# Patient Record
Sex: FEMALE | Race: WHITE | NOT HISPANIC OR LATINO | Employment: FULL TIME | URBAN - METROPOLITAN AREA
[De-identification: names, ages, dates, MRNs, and addresses within clinical notes are randomized per-mention and may not be internally consistent; named-entity substitution may affect disease eponyms.]

---

## 2018-06-09 ENCOUNTER — HOSPITAL ENCOUNTER (EMERGENCY)
Facility: MEDICAL CENTER | Age: 58
End: 2018-06-09
Attending: EMERGENCY MEDICINE
Payer: COMMERCIAL

## 2018-06-09 VITALS
WEIGHT: 186.51 LBS | HEART RATE: 105 BPM | RESPIRATION RATE: 20 BRPM | TEMPERATURE: 97.8 F | SYSTOLIC BLOOD PRESSURE: 117 MMHG | OXYGEN SATURATION: 95 % | DIASTOLIC BLOOD PRESSURE: 80 MMHG | BODY MASS INDEX: 31.84 KG/M2 | HEIGHT: 64 IN

## 2018-06-09 DIAGNOSIS — T85.848A DENTAL IMPLANT PAIN, INITIAL ENCOUNTER: ICD-10-CM

## 2018-06-09 PROCEDURE — 700102 HCHG RX REV CODE 250 W/ 637 OVERRIDE(OP): Performed by: EMERGENCY MEDICINE

## 2018-06-09 PROCEDURE — 99283 EMERGENCY DEPT VISIT LOW MDM: CPT

## 2018-06-09 PROCEDURE — A9270 NON-COVERED ITEM OR SERVICE: HCPCS | Performed by: EMERGENCY MEDICINE

## 2018-06-09 RX ORDER — PENICILLIN V POTASSIUM 500 MG/1
500 TABLET ORAL
Qty: 40 TAB | Refills: 0 | Status: SHIPPED | OUTPATIENT
Start: 2018-06-09 | End: 2018-06-19

## 2018-06-09 RX ORDER — PENICILLIN V POTASSIUM 500 MG/1
500 TABLET ORAL ONCE
Status: COMPLETED | OUTPATIENT
Start: 2018-06-09 | End: 2018-06-09

## 2018-06-09 RX ORDER — IBUPROFEN 600 MG/1
600 TABLET ORAL ONCE
Status: DISCONTINUED | OUTPATIENT
Start: 2018-06-09 | End: 2018-06-09 | Stop reason: HOSPADM

## 2018-06-09 RX ORDER — NAPROXEN 500 MG/1
500 TABLET ORAL ONCE
Status: DISCONTINUED | OUTPATIENT
Start: 2018-06-09 | End: 2018-06-09 | Stop reason: HOSPADM

## 2018-06-09 RX ORDER — IBUPROFEN 600 MG/1
600 TABLET ORAL EVERY 6 HOURS PRN
Qty: 20 TAB | Refills: 0 | Status: SHIPPED | OUTPATIENT
Start: 2018-06-09

## 2018-06-09 RX ADMIN — PENICILLIN V POTASIUM 500 MG: 500 TABLET OROPHARYNGEAL at 10:56

## 2018-06-09 ASSESSMENT — PAIN SCALES - GENERAL: PAINLEVEL_OUTOF10: 9

## 2018-06-09 NOTE — ED PROVIDER NOTES
"ED Provider Note    Scribed for Loco Hector M.D. by Francois Blackmon. 6/9/2018, 10:38 AM.    Primary care provider: None noted  Means of arrival: Walk in  History obtained from: Patient  History limited by: None    CHIEF COMPLAINT  Chief Complaint   Patient presents with   • Dental Pain       HPI  Marla Mckeon is a 58 y.o. female who presents to the Emergency Department complaining of left upper dental pain onset last night. Patient notes having a dental implant. She states the area is infected. She was having a bad taste in her mouth last night, prompting her to gargle with hydrogen peroxide. The pain started after doing this rinse. Patient otherwise does not report any other associated symptoms at this time. She does not report any recent fevers.       REVIEW OF SYSTEMS  Pertinent positives include left upper dental pain.   Pertinent negatives include no recent fevers.    E     PAST MEDICAL HISTORY   None noted    SURGICAL HISTORY  patient denies any surgical history    SOCIAL HISTORY  None noted    FAMILY HISTORY  No pertinent family history reported.     CURRENT MEDICATIONS  Reviewed.  See Encounter Summary.     ALLERGIES  No Known Allergies    PHYSICAL EXAM  VITAL SIGNS: /80   Pulse (!) 105   Temp 36.6 °C (97.8 °F)   Resp 20   Ht 1.626 m (5' 4\")   Wt 84.6 kg (186 lb 8.2 oz)   SpO2 95%   BMI 32.01 kg/m²   Nursing note and vitals reviewed.  Constitutional: No distress.   HENT: Mucus membranes moist.  Oropharynx is clear.  There are scattered caries.  There is no facial edema.  There is tenderness over tooth #10, no apparent swelling.  Dental implant protruding from gum in the location of tooth #10. Tongue is normal.  Floor of the mouth is normal.  Submental space is soft.  Posterior pharynx is normal.  Patient is tolerating secretions without difficulty. There is no evidence of Celso's Angina.  Eyes: Conjunctivae are normal. Pupils are equal, round, and reactive to light.   Cardiovascular: " Normal peripheral perfusion  Respiratory: No respiratory distress.   Musculoskeletal: Normal range of motion.   Neurological: Alert. No focal deficits noted.    Skin: No rash.   Psych: Appropriate for clinical situation       COURSE & MEDICAL DECISION MAKING  Nursing notes, VS, PMSFHx reviewed in chart.    Reviewed the patient's prescription history on [place] Prescription Monitoring Program. Patient's last prescription was 72 norco on May 11, 2018. She had 120 norco on April 30, 2018 and 120 norco on March 20, 2018.      Narcotics: 410 (Value from NarxCheck System.)    Sedatives: 150 (Value from NarxCheck System.)    Stimulants: 80 (Value from NarxCheck System.)     NARxSCORES can range from 000 to 999. This first two digits represent the composite percentile risk based on an overall analysis of prescription drug use. The third digit represents the number of active prescriptions. The distribution of scores in the population is such that approximately 75% fall below 200, 95% fall below 500 and 99% fall below 650.     SELECT THE LINK BELOW TO REVIEW THE NARxCheck REPORT  or  SELECT THE 'ACCEPT' BUTTON TO CONTINUTE AFTER REVIEWING THE SCORES             10:38 AM - Patient seen and examined at bedside. Patient presents with left upper dental pain. Patient will be treated with veetid 500 mg, motrin 600 mg. The patient will then be discharged with instructions regarding supportive care and medications including Veetid, naprosyn, and motrin. Instructions were given for follow-up with oral surgery. Discussed indications for seeking immediate medical attention. Patient was given the opportunity for questions. The patient understands and agrees.      DISPOSITION:  Patient will be discharged home in stable condition.    FOLLOW UP:  [facility], Emergency Dept  [address] [address] [phone]    If symptoms worsen    Low Storey DMD, M.D.  [address]  [place] [facility] [facility]  [address]  [phone]    Schedule an appointment as soon as possible for a visit  oral surgeon      OUTPATIENT MEDICATIONS:  New Prescriptions    IBUPROFEN (MOTRIN) 600 MG TAB    Take 1 Tab by mouth every 6 hours as needed.    PENICILLIN V POTASSIUM (VEETID) 500 MG TAB    Take 1 Tab by mouth 4 Times a Day,Before Meals and at Bedtime for 10 days.       The patient was discharged home with an information sheet on dental pain and told to return immediately for any signs or symptoms listed.  The patient agreed to the discharge precautions and follow-up plan which is documented in EPIC.    FINAL IMPRESSION  1. Dental implant pain, initial encounter          Francois KENT ([place]), am scribing for, and in the presence of, Loco Hector M.D..    Electronically signed by: Francois Blackmon ([place]), 6/9/2018    Loco KENT M.D. personally performed the services described in this documentation, as scribed by Francois Blackmon in my presence, and it is both accurate and complete.    The note accurately reflects work and decisions made by me.  Loco Hector  6/9/2018  11:35 AM

## 2018-06-09 NOTE — DISCHARGE INSTRUCTIONS
Dental Pain  Dental pain may be caused by many things, including:  · Tooth decay (cavities or caries). Cavities expose the nerve of your tooth to air and hot or cold temperatures. This can cause pain or discomfort.  · Abscess or infection. A dental abscess is a collection of infected pus from a bacterial infection in the inner part of the tooth (pulp). It usually occurs at the end of the tooth’s root.  · Injury.  · An unknown reason (idiopathic).  Your pain may be mild or severe. It may only occur when:  · You are chewing.  · You are exposed to hot or cold temperature.  · You are eating or drinking sugary foods or beverages, such as soda or candy.  Your pain may also be constant.  Follow these instructions at home:  Watch your dental pain for any changes. The following actions may help to lessen any discomfort that you are feeling:  · Take medicines only as directed by your dentist.  · If you were prescribed an antibiotic medicine, finish all of it even if you start to feel better.  · Keep all follow-up visits as directed by your dentist. This is important.  · Do not apply heat to the outside of your face.  · Rinse your mouth or gargle with salt water if directed by your dentist. This helps with pain and swelling.  ¨ You can make salt water by adding ¼ tsp of salt to 1 cup of warm water.  · Apply ice to the painful area of your face:  ¨ Put ice in a plastic bag.  ¨ Place a towel between your skin and the bag.  ¨ Leave the ice on for 20 minutes, 2-3 times per day.  · Avoid foods or drinks that cause you pain, such as:  ¨ Very hot or very cold foods or drinks.  ¨ Sweet or sugary foods or drinks.  Contact a health care provider if:  · Your pain is not controlled with medicines.  · Your symptoms are worse.  · You have new symptoms.  Get help right away if:  · You are unable to open your mouth.  · You are having trouble breathing or swallowing.  · You have a fever.  · Your face, neck, or jaw is swollen.  This information  is not intended to replace advice given to you by your health care provider. Make sure you discuss any questions you have with your health care provider.  Document Released: 12/18/2006 Document Revised: 04/27/2017 Document Reviewed: 12/14/2015  [employer] Interactive Patient Education © 2017 [employer]

## 2018-09-22 ENCOUNTER — OFFICE VISIT (OUTPATIENT)
Dept: URGENT CARE | Facility: PHYSICIAN GROUP | Age: 58
End: 2018-09-22
Payer: COMMERCIAL

## 2018-09-22 ENCOUNTER — APPOINTMENT (OUTPATIENT)
Dept: RADIOLOGY | Facility: IMAGING CENTER | Age: 58
End: 2018-09-22
Attending: PHYSICIAN ASSISTANT
Payer: COMMERCIAL

## 2018-09-22 VITALS
WEIGHT: 169 LBS | HEART RATE: 100 BPM | HEIGHT: 65 IN | DIASTOLIC BLOOD PRESSURE: 62 MMHG | SYSTOLIC BLOOD PRESSURE: 98 MMHG | TEMPERATURE: 98 F | BODY MASS INDEX: 28.16 KG/M2 | OXYGEN SATURATION: 98 %

## 2018-09-22 DIAGNOSIS — S93.401A SPRAIN OF RIGHT ANKLE, UNSPECIFIED LIGAMENT, INITIAL ENCOUNTER: ICD-10-CM

## 2018-09-22 DIAGNOSIS — J01.00 ACUTE NON-RECURRENT MAXILLARY SINUSITIS: ICD-10-CM

## 2018-09-22 PROCEDURE — 99204 OFFICE O/P NEW MOD 45 MIN: CPT | Mod: 25 | Performed by: PHYSICIAN ASSISTANT

## 2018-09-22 PROCEDURE — 73610 X-RAY EXAM OF ANKLE: CPT | Mod: 26,RT | Performed by: PHYSICIAN ASSISTANT

## 2018-09-22 RX ORDER — ACETAMINOPHEN 500 MG
1000 TABLET ORAL ONCE
Status: COMPLETED | OUTPATIENT
Start: 2018-09-22 | End: 2018-09-22

## 2018-09-22 RX ORDER — IBUPROFEN 800 MG/1
800 TABLET ORAL EVERY 8 HOURS PRN
Qty: 60 TAB | Refills: 1 | Status: SHIPPED | OUTPATIENT
Start: 2018-09-22

## 2018-09-22 RX ORDER — LISINOPRIL 10 MG/1
10 TABLET ORAL DAILY
COMMUNITY

## 2018-09-22 RX ORDER — TRIAMTERENE AND HYDROCHLOROTHIAZIDE 37.5; 25 MG/1; MG/1
1 TABLET ORAL DAILY
COMMUNITY

## 2018-09-22 RX ORDER — AMOXICILLIN AND CLAVULANATE POTASSIUM 875; 125 MG/1; MG/1
1 TABLET, FILM COATED ORAL 2 TIMES DAILY
Qty: 20 TAB | Refills: 0 | Status: SHIPPED | OUTPATIENT
Start: 2018-09-22

## 2018-09-22 RX ADMIN — Medication 1000 MG: at 10:24

## 2018-09-22 ASSESSMENT — ENCOUNTER SYMPTOMS
TINGLING: 0
NUMBNESS: 0
LOSS OF SENSATION: 0
FOCAL WEAKNESS: 0
SENSORY CHANGE: 0
LOSS OF MOTION: 0
INABILITY TO BEAR WEIGHT: 1

## 2018-09-22 ASSESSMENT — PAIN SCALES - GENERAL: PAINLEVEL: 8=MODERATE-SEVERE PAIN

## 2018-09-22 NOTE — PROGRESS NOTES
Subjective:      Marla Mckeon is a 58 y.o. female who presents with Ankle Injury (Rolled R ankle, fell on uneven surface this morning )    PMH:  Reviewed with patient/family member/EPIC.   MEDS:   Current Outpatient Prescriptions:   •  lisinopril (PRINIVIL) 10 MG Tab, Take 10 mg by mouth every day., Disp: , Rfl:   •  triamterene-hctz (MAXZIDE-25/DYAZIDE) 37.5-25 MG Tab, Take 1 Tab by mouth every day., Disp: , Rfl:   •  amoxicillin-clavulanate (AUGMENTIN) 875-125 MG Tab, Take 1 Tab by mouth 2 times a day., Disp: 20 Tab, Rfl: 0  •  ibuprofen (MOTRIN) 800 MG Tab, Take 1 Tab by mouth every 8 hours as needed., Disp: 60 Tab, Rfl: 1  •  ibuprofen (MOTRIN) 600 MG Tab, Take 1 Tab by mouth every 6 hours as needed., Disp: 20 Tab, Rfl: 0    Current Facility-Administered Medications:   •  acetaminophen (TYLENOL) tablet 1,000 mg, 1,000 mg, Oral, Once, Lucero Cruz, P.A.-C.  ALLERGIES: No Known Allergies  SURGHX: History reviewed. No pertinent surgical history.  SOCHX:  reports that she has been smoking.  She has been smoking about 0.25 packs per day. She has never used smokeless tobacco. She reports that she does not drink alcohol or use drugs.  FH:  Reviewed with patient/family. Not pertinent to this complaint.          Patient presents with:  Ankle Injury: Rolled R ankle, fell on uneven surface this morning while walking in her home.  Pt co swollen painful right ankle. Denies any other injury.      PT would also like to address her possible sinus infection since she is here.  Pt has had sinus pain, fullness, and congestion x 10 days, thick purulent discharge, maxillary pressure, face pain.  OTC medications not resolving symptoms.           Ankle Injury    The incident occurred 1 to 3 hours ago. The incident occurred at home. The injury mechanism was a twisting injury. The pain is present in the right ankle. The quality of the pain is described as aching. The pain is at a severity of 8/10. The pain has been constant since  "onset. Associated symptoms include an inability to bear weight. Pertinent negatives include no loss of motion, loss of sensation, numbness or tingling. She reports no foreign bodies present. The symptoms are aggravated by movement, palpation and weight bearing. She has tried ice and NSAIDs for the symptoms. The treatment provided no relief.       Review of Systems   Musculoskeletal: Positive for joint pain.   Neurological: Negative for tingling, sensory change, focal weakness and numbness.   All other systems reviewed and are negative.         Objective:     BP (!) 98/62 (BP Location: Right arm)   Pulse 100   Temp 36.7 °C (98 °F)   Ht 1.638 m (5' 4.5\")   Wt 76.7 kg (169 lb)   SpO2 98%   BMI 28.56 kg/m²      Physical Exam   Constitutional: She is oriented to person, place, and time. She appears well-developed and well-nourished. No distress.   HENT:   Head: Normocephalic and atraumatic.   Nose: Nose normal.   Eyes: Pupils are equal, round, and reactive to light. Conjunctivae and EOM are normal.   Neck: Normal range of motion. Neck supple.   Cardiovascular: Normal rate and intact distal pulses.    Pulmonary/Chest: Effort normal.   Musculoskeletal:        Right ankle: She exhibits decreased range of motion, swelling and ecchymosis. She exhibits no deformity, no laceration and normal pulse. Tenderness. Lateral malleolus, AITFL and head of 5th metatarsal tenderness found. No posterior TFL and no proximal fibula tenderness found. Achilles tendon normal.        Feet:    Neurological: She is alert and oriented to person, place, and time.   Skin: Skin is warm and dry. Capillary refill takes less than 2 seconds.   Psychiatric: She has a normal mood and affect.   Nursing note and vitals reviewed.         Xray images viewed and interpreted by me, confirmed by radiology:    Neg for acute fracture, dislocation, + soft tissue swelling.        Assessment/Plan:     1. Sprain of right ankle, unspecified ligament, initial " encounter  DX-ANKLE 3+ VIEWS RIGHT    REFERRAL TO ORTHOPEDICS    ibuprofen (MOTRIN) 800 MG Tab    acetaminophen (TYLENOL) tablet 1,000 mg   2. Acute non-recurrent maxillary sinusitis  amoxicillin-clavulanate (AUGMENTIN) 875-125 MG Tab    REFERRAL TO ORTHOPEDICS    ibuprofen (MOTRIN) 800 MG Tab    acetaminophen (TYLENOL) tablet 1,000 mg     ACE wrap and air splint applied to right ankle, crutches fit to patient.      RICE TREATMENT FOR EXTREMITY INJURIES:  R-rest the extremity as much as possible while pain and swelling persist  I-ice the extremity 15 minutes every 2 hours for the first 24 hours, then 4-5 times daily   C-compress the extremity either with splint or ace wrap as directed  E-elevate the extremity to help with swelling    Motrin/Advil/Ibuprophen 600 mg every 6 hours as needed for pain or fever.    PT should follow up with ortho in 5-7 days for re-evaluation if symptoms have not improved.  Discussed red flags and reasons to return to UC or ED.  Pt and/or family verbalized understanding of diagnosis and follow up instructions and was offered informational handout on diagnosis.  PT discharged.

## 2018-09-22 NOTE — LETTER
September 22, 2018         Patient: Marla Mckeon   YOB: 1960   Date of Visit: 9/22/2018           To Whom it May Concern:    Marla Mckeon was seen in my clinic on 9/22/2018. She may return to work on 9/25/2018.  If you have any questions or concerns, please don't hesitate to call.        Sincerely,           Lucero Cruz P.A.-C.  Electronically Signed

## 2018-09-23 ENCOUNTER — APPOINTMENT (OUTPATIENT)
Dept: RADIOLOGY | Facility: IMAGING CENTER | Age: 58
End: 2018-09-23
Attending: PHYSICIAN ASSISTANT
Payer: COMMERCIAL

## 2018-09-23 ENCOUNTER — TELEPHONE (OUTPATIENT)
Dept: URGENT CARE | Facility: PHYSICIAN GROUP | Age: 58
End: 2018-09-23

## 2018-09-23 DIAGNOSIS — J01.00 ACUTE NON-RECURRENT MAXILLARY SINUSITIS: ICD-10-CM

## 2018-09-23 DIAGNOSIS — S93.401A SPRAIN OF RIGHT ANKLE, UNSPECIFIED LIGAMENT, INITIAL ENCOUNTER: ICD-10-CM

## 2018-09-23 PROCEDURE — 73590 X-RAY EXAM OF LOWER LEG: CPT | Mod: TC,RT | Performed by: PHYSICIAN ASSISTANT

## 2018-09-23 NOTE — TELEPHONE ENCOUNTER
Returning patient's call regarding right leg pain.  I saw patient yesterday after a fall for right ankle pain which x-ray was negative for fracture, patient was placed in an Ace wrap and Aircast and crutches. Patient states the pain is now significantly worse at the top of her fibula it wasn't bothering her yesterday. Patient is concerned that either the Aircast is present and pressure on the bone or no that her ankle is supported and stable she's feeling pain in a play she didn't feel yesterday patient is quite concerned about this. I will place an x-ray for a tib-fib and she can have it done today when she gets a ride. Patient was appreciative of this idea and plan. Patient was instructed to stay in the clinic when she gets her x-ray done until the results are back.

## 2018-09-23 NOTE — PROGRESS NOTES
PT aware that xray is negative.      Impression       1.  Lateral soft tissue swelling at the ankle.  2.  No fracture the RIGHT tibia or fibula.  3.  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  Correlate with point tenderness.   Reading Provider Reading Date   Andres Marion M.D. Sep 23, 2018   Signing Provider Signing Date     PT encouraged to continue RICE treatment and NSAIDS.      PT can by seen by [facility] tomorrow if desired.

## 2019-01-21 ENCOUNTER — HOSPITAL ENCOUNTER (EMERGENCY)
Dept: HOSPITAL 8 - ED | Age: 59
Discharge: HOME | End: 2019-01-21
Payer: COMMERCIAL

## 2019-01-21 VITALS — DIASTOLIC BLOOD PRESSURE: 78 MMHG | SYSTOLIC BLOOD PRESSURE: 135 MMHG

## 2019-01-21 VITALS — WEIGHT: 180.78 LBS | HEIGHT: 64 IN | BODY MASS INDEX: 30.86 KG/M2

## 2019-01-21 DIAGNOSIS — N10: Primary | ICD-10-CM

## 2019-01-21 DIAGNOSIS — I10: ICD-10-CM

## 2019-01-21 DIAGNOSIS — Z87.442: ICD-10-CM

## 2019-01-21 LAB
ALBUMIN SERPL-MCNC: 3.8 G/DL (ref 3.4–5)
ALP SERPL-CCNC: 109 U/L (ref 45–117)
ALT SERPL-CCNC: 33 U/L (ref 12–78)
ANION GAP SERPL CALC-SCNC: 7 MMOL/L (ref 5–15)
BASOPHILS # BLD AUTO: 0.05 X10^3/UL (ref 0–0.1)
BASOPHILS NFR BLD AUTO: 0 % (ref 0–1)
BILIRUB SERPL-MCNC: 0.3 MG/DL (ref 0.2–1)
CALCIUM SERPL-MCNC: 8.9 MG/DL (ref 8.5–10.1)
CHLORIDE SERPL-SCNC: 104 MMOL/L (ref 98–107)
CREAT SERPL-MCNC: 1.05 MG/DL (ref 0.55–1.02)
CULTURE INDICATED?: YES
EOSINOPHIL # BLD AUTO: 0.19 X10^3/UL (ref 0–0.4)
EOSINOPHIL NFR BLD AUTO: 2 % (ref 1–7)
ERYTHROCYTE [DISTWIDTH] IN BLOOD BY AUTOMATED COUNT: 13.7 % (ref 9.6–15.2)
LYMPHOCYTES # BLD AUTO: 2.14 X10^3/UL (ref 1–3.4)
LYMPHOCYTES NFR BLD AUTO: 17 % (ref 22–44)
MCH RBC QN AUTO: 31.1 PG (ref 27–34.8)
MCHC RBC AUTO-ENTMCNC: 34.5 G/DL (ref 32.4–35.8)
MCV RBC AUTO: 89.9 FL (ref 80–100)
MD: NO
MICROSCOPIC: (no result)
MONOCYTES # BLD AUTO: 1.01 X10^3/UL (ref 0.2–0.8)
MONOCYTES NFR BLD AUTO: 8 % (ref 2–9)
NEUTROPHILS # BLD AUTO: 9.39 X10^3/UL (ref 1.8–6.8)
NEUTROPHILS NFR BLD AUTO: 74 % (ref 42–75)
PLATELET # BLD AUTO: 351 X10^3/UL (ref 130–400)
PMV BLD AUTO: 6.9 FL (ref 7.4–10.4)
PROT SERPL-MCNC: 7.5 G/DL (ref 6.4–8.2)
RBC # BLD AUTO: 5.15 X10^6/UL (ref 3.82–5.3)

## 2019-01-21 PROCEDURE — 36415 COLL VENOUS BLD VENIPUNCTURE: CPT

## 2019-01-21 PROCEDURE — 74176 CT ABD & PELVIS W/O CONTRAST: CPT

## 2019-01-21 PROCEDURE — 80053 COMPREHEN METABOLIC PANEL: CPT

## 2019-01-21 PROCEDURE — 96372 THER/PROPH/DIAG INJ SC/IM: CPT

## 2019-01-21 PROCEDURE — 87186 SC STD MICRODIL/AGAR DIL: CPT

## 2019-01-21 PROCEDURE — 87086 URINE CULTURE/COLONY COUNT: CPT

## 2019-01-21 PROCEDURE — 81001 URINALYSIS AUTO W/SCOPE: CPT

## 2019-01-21 PROCEDURE — 87077 CULTURE AEROBIC IDENTIFY: CPT

## 2019-01-21 PROCEDURE — 85025 COMPLETE CBC W/AUTO DIFF WBC: CPT

## 2019-01-21 PROCEDURE — 99284 EMERGENCY DEPT VISIT MOD MDM: CPT

## 2019-01-21 NOTE — NUR
Assumed care of patient.  C/O right flank pain.  Hx kidney stone with 
lithotripsy.  Will continue to monitor.

## 2019-03-19 NOTE — ED NOTES
Patient verbalizes understanding of medications, pain management, and follow up with dentist.  Patient discharged with prescriptions.  All questions answered   Writer called pt to gather more information. Pt reports she tried the gabapentin for two nights. She did not like the way the medication made her feel and said it made her feel very emotional. The pt decided to stop taking the gabapentin and instead restarted trazodone 100 mg without any issues. She only wanted to alert the provider of this and does not have any questions. Writer agreed to relay this to the provider.